# Patient Record
Sex: FEMALE | Race: WHITE | Employment: OTHER | URBAN - NONMETROPOLITAN AREA
[De-identification: names, ages, dates, MRNs, and addresses within clinical notes are randomized per-mention and may not be internally consistent; named-entity substitution may affect disease eponyms.]

---

## 2020-06-21 ENCOUNTER — OFFICE VISIT (OUTPATIENT)
Dept: FAMILY MEDICINE | Facility: OTHER | Age: 45
End: 2020-06-21
Attending: PHYSICIAN ASSISTANT
Payer: COMMERCIAL

## 2020-06-21 ENCOUNTER — HOSPITAL ENCOUNTER (OUTPATIENT)
Dept: GENERAL RADIOLOGY | Facility: OTHER | Age: 45
End: 2020-06-21
Attending: PHYSICIAN ASSISTANT
Payer: COMMERCIAL

## 2020-06-21 VITALS
BODY MASS INDEX: 38.41 KG/M2 | DIASTOLIC BLOOD PRESSURE: 78 MMHG | OXYGEN SATURATION: 98 % | SYSTOLIC BLOOD PRESSURE: 124 MMHG | WEIGHT: 216.8 LBS | HEIGHT: 63 IN | HEART RATE: 74 BPM | TEMPERATURE: 98.4 F | RESPIRATION RATE: 16 BRPM

## 2020-06-21 DIAGNOSIS — S69.92XA HAND INJURY, LEFT, INITIAL ENCOUNTER: Primary | ICD-10-CM

## 2020-06-21 PROCEDURE — 73130 X-RAY EXAM OF HAND: CPT | Mod: LT

## 2020-06-21 PROCEDURE — 99202 OFFICE O/P NEW SF 15 MIN: CPT | Performed by: PHYSICIAN ASSISTANT

## 2020-06-21 RX ORDER — ESCITALOPRAM OXALATE 20 MG/1
TABLET ORAL
COMMUNITY
Start: 2020-04-06

## 2020-06-21 RX ORDER — BUPROPION HYDROCHLORIDE 150 MG/1
TABLET ORAL
COMMUNITY
Start: 2020-01-16

## 2020-06-21 RX ORDER — LEVOTHYROXINE SODIUM 150 UG/1
TABLET ORAL
COMMUNITY
Start: 2020-02-14

## 2020-06-21 SDOH — HEALTH STABILITY: MENTAL HEALTH: HOW OFTEN DO YOU HAVE A DRINK CONTAINING ALCOHOL?: NEVER

## 2020-06-21 ASSESSMENT — MIFFLIN-ST. JEOR: SCORE: 1592.53

## 2020-06-21 ASSESSMENT — PAIN SCALES - GENERAL: PAINLEVEL: NO PAIN (1)

## 2020-06-21 NOTE — PATIENT INSTRUCTIONS
Left hand injury from repeated strikes by camper crank  Bruising palm of hand and 4/5 metacarpals  Normal ROM, strength is diminished compared to right hand and reduced sensation to soft touch  XR hand: no fracture or bony abnormality  Recommend symptomatic treatments: ice, rest, elevation  Scheduled ibuprofen 600-800 mg 3 times daily for 3-5 days then as needed  Follow up with PCP for a recheck in 1 week  Seek immediate care for    Increased pain or swelling    Arm becomes cold, blue, numb or tingly    Signs of infection: Warmth, drainage, or increased redness or pain around the bruise    Inability to move the injured hand     Frequent bruising for unknown reasons    Patient Education     Hand Contusion  You have a contusion. This is also called a bruise. There is swelling and some bleeding under the skin, but no broken bones. This injury generally takes a few days to a few weeks to heal.  During that time, the bruise will typically change in color from reddish, to purple-blue, to greenish-yellow, then to yellow-brown.  Home care    Elevate the hand to reduce pain and swelling. As much as possible, sit or lie down with the hand raised about the level of your heart. This is especially important during the first 48 hours.    Ice the hand to help reduce pain and swelling. Wrap a cold source (ice pack or ice cubes in a plastic bag) in a thin towel. Apply to the bruised area for 20 minutes every 1 to 2 hours the first day. Continue this 3 to 4 times a day until the pain and swelling goes away.    Unless another medicine was prescribed, you can take acetaminophen, ibuprofen, or naproxen to control pain. (If you have chronic liver or kidney disease or ever had a stomach ulcer or gastrointestinal bleeding, talk with your doctor before using these medicines.)  Follow up  Follow up with your healthcare provider or our staff as advised. Call if you are not improving within 1 to 2 weeks.  When to seek medical advice   Call  your healthcare provider right away if you have any of the following:    Increased pain or swelling    Arm becomes cold, blue, numb or tingly    Signs of infection: Warmth, drainage, or increased redness or pain around the bruise    Inability to move the injured hand     Frequent bruising for unknown reasons  Date Last Reviewed: 2/1/2017 2000-2019 [employer]. [address] [place], [place] [id number]. All rights reserved. This information is not intended as a substitute for professional medical care. Always follow your healthcare professional's instructions.

## 2020-06-21 NOTE — PROGRESS NOTES
"SUBJECTIVE:  Nori Olivarez is a 44 year old female who sustained a left hand injury  Onset 6/17/2020 when she camping and they were setting up the fish house  Mechanism of injury:   Associated symptoms - swelling and ecchymosis palm of hand and 4/5 metacarpal    Location - palm left hand  Quality - constant ache  Severity - 1/10 now, was severe initially  Aggravated by - pressure/touch  Alleviated by - rest  Treatments - ice, ibuprofen 400 gm the night of the injury  Prior fracture or injury - none      History reviewed. No pertinent past medical history.  Current Outpatient Medications   Medication     buPROPion (WELLBUTRIN XL) 150 MG 24 hr tablet     escitalopram (LEXAPRO) 20 MG tablet     levothyroxine (SYNTHROID/LEVOTHROID) 150 MCG tablet     No current facility-administered medications for this visit.         Allergies   Allergen Reactions     Penicillins Hives     Other reaction(s): Rash  PENICILLIN V POTASSIUM  HUT Comment: PENICILLIN V POTASSIUM           ROS  General: feels well, no fever  Musculoskeletal: injury per HPI      OBJECTIVE:  Vitals:    06/21/20 1643   BP: 124/78   Pulse: 74   Resp: 16   Temp: 98.4  F (36.9  C)   TempSrc: Tympanic   SpO2: 98%   Weight: 98.3 kg (216 lb 12.8 oz)   Height: 1.6 m (5' 3\")     Vital signs as noted above.  Appearance: in no apparent distress.  Musculoskeletal: left hand  Inspection: ecchymosis over palm of hand, slight swelling over 4/5 metacarpal and slight ecchymosis this area as well.   Palpation: mildly TTP  Neurovascular: normal pulses, cap refill, temperature. Has reduced sensation to soft touch fingers  ROM: normal, strength slightly decreased    Normal exam: wrist, forearm, elbow    ASSESSMENT:  (S69.92XA) Hand injury, left, initial encounter  (primary encounter diagnosis)  Plan: XR Hand Left G/E 3 Views    Left hand injury from repeated strikes by camper crank  Bruising palm of hand and 4/5 metacarpals  Normal ROM, strength is diminished compared to right " hand and reduced sensation to soft touch  XR hand: no fracture or bony abnormality. Image independently reviewed. Pending radiology confirmation.   Recommend symptomatic treatments: ice, rest, elevation  Scheduled ibuprofen 600-800 mg 3 times daily for 3-5 days then as needed  velcro wrist brace for support  Follow up with PCP for a recheck in 1 week  Patient received verbal and written instruction including review of warning signs    Pam Suárez PA-C on 6/21/2020 at 5:26 PM

## 2020-06-21 NOTE — NURSING NOTE
Patient presents to the clinic for left arm injury that happened on Wednesday. Patient states her arm slipped while cranking down their fish house. She has used ice for treatment.  Medication Reconciliation: complete    Alison Benitez, CMA